# Patient Record
Sex: FEMALE | Race: OTHER | HISPANIC OR LATINO | URBAN - METROPOLITAN AREA
[De-identification: names, ages, dates, MRNs, and addresses within clinical notes are randomized per-mention and may not be internally consistent; named-entity substitution may affect disease eponyms.]

---

## 2019-08-12 ENCOUNTER — OUTPATIENT (OUTPATIENT)
Dept: OUTPATIENT SERVICES | Facility: HOSPITAL | Age: 19
LOS: 1 days | End: 2019-08-12
Payer: COMMERCIAL

## 2019-08-12 VITALS
TEMPERATURE: 98 F | DIASTOLIC BLOOD PRESSURE: 58 MMHG | RESPIRATION RATE: 16 BRPM | HEART RATE: 74 BPM | SYSTOLIC BLOOD PRESSURE: 113 MMHG

## 2019-08-12 VITALS — RESPIRATION RATE: 16 BRPM | TEMPERATURE: 98 F

## 2019-08-12 DIAGNOSIS — O47.02 FALSE LABOR BEFORE 37 COMPLETED WEEKS OF GESTATION, SECOND TRIMESTER: ICD-10-CM

## 2019-08-12 LAB
APPEARANCE UR: CLEAR — SIGNIFICANT CHANGE UP
BILIRUB UR-MCNC: NEGATIVE — SIGNIFICANT CHANGE UP
COLOR SPEC: YELLOW — SIGNIFICANT CHANGE UP
DIFF PNL FLD: NEGATIVE — SIGNIFICANT CHANGE UP
GLUCOSE UR QL: NEGATIVE MG/DL — SIGNIFICANT CHANGE UP
KETONES UR-MCNC: NEGATIVE — SIGNIFICANT CHANGE UP
LEUKOCYTE ESTERASE UR-ACNC: NEGATIVE — SIGNIFICANT CHANGE UP
NITRITE UR-MCNC: NEGATIVE — SIGNIFICANT CHANGE UP
PH UR: 8 — SIGNIFICANT CHANGE UP (ref 5–8)
PROT UR-MCNC: NEGATIVE MG/DL — SIGNIFICANT CHANGE UP
SP GR SPEC: 1.01 — SIGNIFICANT CHANGE UP (ref 1.01–1.02)
UROBILINOGEN FLD QL: NEGATIVE MG/DL — SIGNIFICANT CHANGE UP

## 2019-08-12 PROCEDURE — 59050 FETAL MONITOR W/REPORT: CPT

## 2019-08-12 PROCEDURE — G0463: CPT

## 2019-08-12 PROCEDURE — 81003 URINALYSIS AUTO W/O SCOPE: CPT

## 2019-08-12 PROCEDURE — 59025 FETAL NON-STRESS TEST: CPT

## 2019-08-12 NOTE — OB PROVIDER TRIAGE NOTE - HISTORY OF PRESENT ILLNESS
DONNELL NARANJO is a 19y  @ [id number] who presents to L&D due to suprapubic pain for 1 hour.    Patient states that one hour prior to admission, she began experiencing a sudden onset lower abdominal pain. States that the pain was sharp in nature, and radiated to her lower back. Abdominal pain was associated with one episode of vomiting, but otherwise denies any fevers, chills, dysuria, or vaginal bleeding.  Denies any leakage of fluid, or contractions, + Fetal movement.    Prenatal course otherwise uncomplicated      OBhx: Denies  PMH: Denies  PSH: Denies  Med: Denies  Allergies: Denies

## 2019-08-12 NOTE — OB PROVIDER TRIAGE NOTE - NSHPPHYSICALEXAM_GEN_ALL_CORE
Vitals:   Vital Signs Last 24 Hrs  T(C): 36.8 (12 Aug 2019 01:14), Max: 36.8 (12 Aug 2019 01:14)  T(F): 98.2 (12 Aug 2019 01:14), Max: 98.2 (12 Aug 2019 01:14)  HR: 74 (12 Aug 2019 01:18) (74 - 74)  BP: 113/58 (12 Aug 2019 01:18) (113/58 - 113/58)  RR: 16 (12 Aug 2019 01:14) (16 - 16)      General: AOx3, NAD  Heart: RRR  Lungs: CTAB  Abd: Soft, nontender, gravid  EXT: + CVA tenderness  SVE: Closed          FHT: 150 bpm, moderate variability, no decelerations present  [place]: No contractions present

## 2019-08-12 NOTE — OB PROVIDER TRIAGE NOTE - NSOBPROVIDERNOTE_OBGYN_ALL_OB_FT
DONNELL FERRARAJOHNJUAN is a 19y  @ [id number] who presents to L&D due to suprapubic pain for 1 hour.    - UA:  - Discharge home, follow up with Dr. Gonzales within the next week  - Counseled patient to return to L&D if presenting with decreased fetal movement, vaginal bleeding, leakage of fluid or contractions    D/w Dr. Gonzales

## 2019-08-12 NOTE — OB RN TRIAGE NOTE - NS_TRIAGEADDITIONAL COMMENTS_OBGYN_ALL_OB_FT
FHR strip reviewed and approved by MD Mercedes. As per MD Mercedes pt can be discharged at this time. Pt received  OB instruction, all questions answered and verbalized understanding. Pt was discharged in stable condition.

## 2019-11-11 ENCOUNTER — INPATIENT (INPATIENT)
Facility: HOSPITAL | Age: 19
LOS: 2 days | Discharge: HOME | End: 2019-11-14
Attending: SPECIALIST | Admitting: SPECIALIST
Payer: COMMERCIAL

## 2019-11-11 VITALS
RESPIRATION RATE: 13 BRPM | HEART RATE: 90 BPM | TEMPERATURE: 99 F | SYSTOLIC BLOOD PRESSURE: 116 MMHG | DIASTOLIC BLOOD PRESSURE: 63 MMHG

## 2019-11-11 DIAGNOSIS — Z3A.40 40 WEEKS GESTATION OF PREGNANCY: ICD-10-CM

## 2019-11-11 DIAGNOSIS — O47.03 FALSE LABOR BEFORE 37 COMPLETED WEEKS OF GESTATION, THIRD TRIMESTER: ICD-10-CM

## 2019-11-11 DIAGNOSIS — O26.893 OTHER SPECIFIED PREGNANCY RELATED CONDITIONS, THIRD TRIMESTER: ICD-10-CM

## 2019-11-11 LAB
ABO RH CONFIRMATION: SIGNIFICANT CHANGE UP
APPEARANCE UR: CLEAR — SIGNIFICANT CHANGE UP
BASOPHILS # BLD AUTO: 0.06 K/UL — SIGNIFICANT CHANGE UP (ref 0–0.2)
BASOPHILS NFR BLD AUTO: 0.5 % — SIGNIFICANT CHANGE UP (ref 0–2)
BILIRUB UR-MCNC: NEGATIVE — SIGNIFICANT CHANGE UP
BLD GP AB SCN SERPL QL: SIGNIFICANT CHANGE UP
COLOR SPEC: YELLOW — SIGNIFICANT CHANGE UP
DIFF PNL FLD: NEGATIVE — SIGNIFICANT CHANGE UP
EOSINOPHIL # BLD AUTO: 0.08 K/UL — SIGNIFICANT CHANGE UP (ref 0–0.5)
EOSINOPHIL NFR BLD AUTO: 0.6 % — SIGNIFICANT CHANGE UP (ref 0–6)
GLUCOSE UR QL: NEGATIVE MG/DL — SIGNIFICANT CHANGE UP
HCT VFR BLD CALC: 34.1 % — LOW (ref 34.5–45)
HGB BLD-MCNC: 11.3 G/DL — LOW (ref 11.5–15.5)
IMM GRANULOCYTES NFR BLD AUTO: 1.3 % — SIGNIFICANT CHANGE UP (ref 0–1.5)
KETONES UR-MCNC: NEGATIVE — SIGNIFICANT CHANGE UP
LEUKOCYTE ESTERASE UR-ACNC: NEGATIVE — SIGNIFICANT CHANGE UP
LYMPHOCYTES # BLD AUTO: 18.8 % — SIGNIFICANT CHANGE UP (ref 13–44)
LYMPHOCYTES # BLD AUTO: 2.41 K/UL — SIGNIFICANT CHANGE UP (ref 1–3.3)
MCHC RBC-ENTMCNC: 30.3 PG — SIGNIFICANT CHANGE UP (ref 27–34)
MCHC RBC-ENTMCNC: 33.1 GM/DL — SIGNIFICANT CHANGE UP (ref 32–36)
MCV RBC AUTO: 91.4 FL — SIGNIFICANT CHANGE UP (ref 80–100)
MONOCYTES # BLD AUTO: 0.83 K/UL — SIGNIFICANT CHANGE UP (ref 0–0.9)
MONOCYTES NFR BLD AUTO: 6.5 % — SIGNIFICANT CHANGE UP (ref 2–14)
NEUTROPHILS # BLD AUTO: 9.25 K/UL — HIGH (ref 1.8–7.4)
NEUTROPHILS NFR BLD AUTO: 72.3 % — SIGNIFICANT CHANGE UP (ref 43–77)
NITRITE UR-MCNC: NEGATIVE — SIGNIFICANT CHANGE UP
PH UR: 8 — SIGNIFICANT CHANGE UP (ref 5–8)
PLATELET # BLD AUTO: 268 K/UL — SIGNIFICANT CHANGE UP (ref 150–400)
PROT UR-MCNC: NEGATIVE MG/DL — SIGNIFICANT CHANGE UP
RBC # BLD: 3.73 M/UL — LOW (ref 3.8–5.2)
RBC # FLD: 13.4 % — SIGNIFICANT CHANGE UP (ref 10.3–14.5)
SP GR SPEC: 1.02 — SIGNIFICANT CHANGE UP (ref 1.01–1.02)
UROBILINOGEN FLD QL: NEGATIVE MG/DL — SIGNIFICANT CHANGE UP
WBC # BLD: 12.79 K/UL — HIGH (ref 3.8–10.5)
WBC # FLD AUTO: 12.79 K/UL — HIGH (ref 3.8–10.5)

## 2019-11-11 RX ORDER — SODIUM CHLORIDE 9 MG/ML
1000 INJECTION, SOLUTION INTRAVENOUS
Refills: 0 | Status: DISCONTINUED | OUTPATIENT
Start: 2019-11-11 | End: 2019-11-12

## 2019-11-11 RX ORDER — CITRIC ACID/SODIUM CITRATE 300-500 MG
30 SOLUTION, ORAL ORAL ONCE
Refills: 0 | Status: COMPLETED | OUTPATIENT
Start: 2019-11-11 | End: 2019-11-11

## 2019-11-11 RX ORDER — OXYTOCIN 10 UNIT/ML
333.33 VIAL (ML) INJECTION
Qty: 20 | Refills: 0 | Status: COMPLETED | OUTPATIENT
Start: 2019-11-11 | End: 2019-11-11

## 2019-11-11 RX ORDER — SODIUM CHLORIDE 9 MG/ML
1000 INJECTION, SOLUTION INTRAVENOUS ONCE
Refills: 0 | Status: COMPLETED | OUTPATIENT
Start: 2019-11-11 | End: 2019-11-11

## 2019-11-11 RX ORDER — FERROUS SULFATE 325(65) MG
1 TABLET ORAL
Qty: 0 | Refills: 0 | DISCHARGE

## 2019-11-11 RX ADMIN — SODIUM CHLORIDE 1000 MILLILITER(S): 9 INJECTION, SOLUTION INTRAVENOUS at 21:00

## 2019-11-11 RX ADMIN — Medication 30 MILLILITER(S): at 21:20

## 2019-11-11 RX ADMIN — SODIUM CHLORIDE 125 MILLILITER(S): 9 INJECTION, SOLUTION INTRAVENOUS at 20:45

## 2019-11-11 NOTE — OB PROVIDER H&P - HISTORY OF PRESENT ILLNESS
Patient is a 19  at 40 1/7 weeks gestation presenting to L&D for SROM at 1900 this evening.    She reports she has had contractions throughout the day and felt a gush of clear fluid around 7PM. She reports good fetal movement. She denies vaginal bleeding.    This pregnancy has been uncomplicated.    OBHx: denies  PMH: denies  PSH: denies  Meds: PNV  All: NKDA

## 2019-11-11 NOTE — CHART NOTE - NSCHARTNOTEFT_GEN_A_CORE
Patient reporting rectal pressure. She is requesting epidural for pain management.    Vital Signs Last 24 Hrs  T(C): 37 (11 Nov 2019 20:38), Max: 37.1 (11 Nov 2019 18:20)  T(F): 98.6 (11 Nov 2019 20:38), Max: 98.8 (11 Nov 2019 18:20)  HR: 82 (11 Nov 2019 20:38) (82 - 90)  BP: 127/73 (11 Nov 2019 20:38) (116/63 - 127/73)  RR: 16 (11 Nov 2019 20:38) (13 - 16)    SVE: 2/80/+1    FHT: baseline 120, moderate variability, + accels, - decels  [place]: ctx q2-3 min    -Dr. Llanos to get consent  -Will call CRNA for epidural placement  -Continue expectant management

## 2019-11-11 NOTE — OB PROVIDER H&P - ASSESSMENT
19  at 40 1/7 weeks gestation presenting to L&D in labor with SROM at 1900    -Admit to L&D  -Admission labs sent  -GBS neg, no need for abx  -Expectant mangement  -Pt requesting epidural for pain management  -Dr. Mercedes to consent patient    Discussed with Dr. Gonzales

## 2019-11-11 NOTE — OB PROVIDER H&P - NSHPPHYSICALEXAM_GEN_ALL_CORE
Vital Signs Last 24 Hrs  T(C): 37.1 (11 Nov 2019 18:20), Max: 37.1 (11 Nov 2019 18:20)  T(F): 98.8 (11 Nov 2019 18:20), Max: 98.8 (11 Nov 2019 18:20)  HR: 82 (11 Nov 2019 20:37) (82 - 90)  BP: 127/73 (11 Nov 2019 20:37) (116/63 - 127/73)  RR: 13 (11 Nov 2019 18:20) (13 - 13)    SVE: 2/50/-1  SSE: grossly ruptured, pooling clear fluid    [place]: cephalic    FHT: baseline 130, moderate variability, + accels, - decels  [place]: ctx 2-4min

## 2019-11-12 LAB — T PALLIDUM AB TITR SER: NEGATIVE — SIGNIFICANT CHANGE UP

## 2019-11-12 RX ORDER — OXYTOCIN 10 UNIT/ML
333.33 VIAL (ML) INJECTION
Qty: 20 | Refills: 0 | Status: DISCONTINUED | OUTPATIENT
Start: 2019-11-12 | End: 2019-11-14

## 2019-11-12 RX ORDER — MAGNESIUM HYDROXIDE 400 MG/1
30 TABLET, CHEWABLE ORAL
Refills: 0 | Status: DISCONTINUED | OUTPATIENT
Start: 2019-11-12 | End: 2019-11-14

## 2019-11-12 RX ORDER — AER TRAVELER 0.5 G/1
1 SOLUTION RECTAL; TOPICAL EVERY 4 HOURS
Refills: 0 | Status: DISCONTINUED | OUTPATIENT
Start: 2019-11-12 | End: 2019-11-14

## 2019-11-12 RX ORDER — ACETAMINOPHEN 500 MG
650 TABLET ORAL EVERY 4 HOURS
Refills: 0 | Status: DISCONTINUED | OUTPATIENT
Start: 2019-11-12 | End: 2019-11-14

## 2019-11-12 RX ORDER — KETOROLAC TROMETHAMINE 30 MG/ML
30 SYRINGE (ML) INJECTION ONCE
Refills: 0 | Status: DISCONTINUED | OUTPATIENT
Start: 2019-11-12 | End: 2019-11-12

## 2019-11-12 RX ORDER — GLYCERIN ADULT
1 SUPPOSITORY, RECTAL RECTAL AT BEDTIME
Refills: 0 | Status: DISCONTINUED | OUTPATIENT
Start: 2019-11-12 | End: 2019-11-14

## 2019-11-12 RX ORDER — IBUPROFEN 200 MG
600 TABLET ORAL EVERY 6 HOURS
Refills: 0 | Status: COMPLETED | OUTPATIENT
Start: 2019-11-12 | End: 2020-10-10

## 2019-11-12 RX ORDER — SODIUM CHLORIDE 9 MG/ML
3 INJECTION INTRAMUSCULAR; INTRAVENOUS; SUBCUTANEOUS EVERY 8 HOURS
Refills: 0 | Status: DISCONTINUED | OUTPATIENT
Start: 2019-11-12 | End: 2019-11-14

## 2019-11-12 RX ORDER — GENTAMICIN SULFATE 40 MG/ML
240 VIAL (ML) INJECTION EVERY 24 HOURS
Refills: 0 | Status: DISCONTINUED | OUTPATIENT
Start: 2019-11-12 | End: 2019-11-13

## 2019-11-12 RX ORDER — DIBUCAINE 1 %
1 OINTMENT (GRAM) RECTAL EVERY 6 HOURS
Refills: 0 | Status: DISCONTINUED | OUTPATIENT
Start: 2019-11-12 | End: 2019-11-14

## 2019-11-12 RX ORDER — AMPICILLIN TRIHYDRATE 250 MG
2 CAPSULE ORAL ONCE
Refills: 0 | Status: COMPLETED | OUTPATIENT
Start: 2019-11-12 | End: 2019-11-12

## 2019-11-12 RX ORDER — PRAMOXINE HYDROCHLORIDE 150 MG/15G
1 AEROSOL, FOAM RECTAL EVERY 4 HOURS
Refills: 0 | Status: DISCONTINUED | OUTPATIENT
Start: 2019-11-12 | End: 2019-11-14

## 2019-11-12 RX ORDER — AMPICILLIN TRIHYDRATE 250 MG
CAPSULE ORAL
Refills: 0 | Status: DISCONTINUED | OUTPATIENT
Start: 2019-11-12 | End: 2019-11-13

## 2019-11-12 RX ORDER — ACETAMINOPHEN 500 MG
975 TABLET ORAL
Refills: 0 | Status: DISCONTINUED | OUTPATIENT
Start: 2019-11-12 | End: 2019-11-14

## 2019-11-12 RX ORDER — BENZOCAINE 10 %
1 GEL (GRAM) MUCOUS MEMBRANE EVERY 6 HOURS
Refills: 0 | Status: DISCONTINUED | OUTPATIENT
Start: 2019-11-12 | End: 2019-11-14

## 2019-11-12 RX ORDER — OXYCODONE HYDROCHLORIDE 5 MG/1
5 TABLET ORAL
Refills: 0 | Status: DISCONTINUED | OUTPATIENT
Start: 2019-11-12 | End: 2019-11-14

## 2019-11-12 RX ORDER — LANOLIN
1 OINTMENT (GRAM) TOPICAL EVERY 6 HOURS
Refills: 0 | Status: DISCONTINUED | OUTPATIENT
Start: 2019-11-12 | End: 2019-11-14

## 2019-11-12 RX ORDER — AMPICILLIN TRIHYDRATE 250 MG
2 CAPSULE ORAL EVERY 6 HOURS
Refills: 0 | Status: DISCONTINUED | OUTPATIENT
Start: 2019-11-12 | End: 2019-11-13

## 2019-11-12 RX ORDER — TETANUS TOXOID, REDUCED DIPHTHERIA TOXOID AND ACELLULAR PERTUSSIS VACCINE, ADSORBED 5; 2.5; 8; 8; 2.5 [IU]/.5ML; [IU]/.5ML; UG/.5ML; UG/.5ML; UG/.5ML
0.5 SUSPENSION INTRAMUSCULAR ONCE
Refills: 0 | Status: DISCONTINUED | OUTPATIENT
Start: 2019-11-12 | End: 2019-11-14

## 2019-11-12 RX ORDER — HYDROCORTISONE 1 %
1 OINTMENT (GRAM) TOPICAL EVERY 6 HOURS
Refills: 0 | Status: DISCONTINUED | OUTPATIENT
Start: 2019-11-12 | End: 2019-11-14

## 2019-11-12 RX ORDER — SIMETHICONE 80 MG/1
80 TABLET, CHEWABLE ORAL EVERY 4 HOURS
Refills: 0 | Status: DISCONTINUED | OUTPATIENT
Start: 2019-11-12 | End: 2019-11-14

## 2019-11-12 RX ORDER — OXYCODONE HYDROCHLORIDE 5 MG/1
5 TABLET ORAL ONCE
Refills: 0 | Status: DISCONTINUED | OUTPATIENT
Start: 2019-11-12 | End: 2019-11-14

## 2019-11-12 RX ORDER — DIPHENHYDRAMINE HCL 50 MG
25 CAPSULE ORAL EVERY 6 HOURS
Refills: 0 | Status: DISCONTINUED | OUTPATIENT
Start: 2019-11-12 | End: 2019-11-14

## 2019-11-12 RX ORDER — IBUPROFEN 200 MG
600 TABLET ORAL EVERY 6 HOURS
Refills: 0 | Status: DISCONTINUED | OUTPATIENT
Start: 2019-11-12 | End: 2019-11-14

## 2019-11-12 RX ADMIN — Medication 200 MILLIGRAM(S): at 05:26

## 2019-11-12 RX ADMIN — SODIUM CHLORIDE 3 MILLILITER(S): 9 INJECTION INTRAMUSCULAR; INTRAVENOUS; SUBCUTANEOUS at 15:49

## 2019-11-12 RX ADMIN — Medication 600 MILLIGRAM(S): at 18:09

## 2019-11-12 RX ADMIN — Medication 216 GRAM(S): at 15:16

## 2019-11-12 RX ADMIN — Medication 1000 MILLIUNIT(S)/MIN: at 08:06

## 2019-11-12 RX ADMIN — Medication 30 MILLIGRAM(S): at 09:45

## 2019-11-12 RX ADMIN — Medication 650 MILLIGRAM(S): at 04:34

## 2019-11-12 RX ADMIN — Medication 216 GRAM(S): at 23:23

## 2019-11-12 RX ADMIN — SODIUM CHLORIDE 3 MILLILITER(S): 9 INJECTION INTRAMUSCULAR; INTRAVENOUS; SUBCUTANEOUS at 22:00

## 2019-11-12 RX ADMIN — Medication 600 MILLIGRAM(S): at 19:00

## 2019-11-12 RX ADMIN — Medication 30 MILLIGRAM(S): at 09:30

## 2019-11-12 RX ADMIN — Medication 975 MILLIGRAM(S): at 16:30

## 2019-11-12 RX ADMIN — Medication 975 MILLIGRAM(S): at 15:48

## 2019-11-12 RX ADMIN — Medication 600 MILLIGRAM(S): at 23:26

## 2019-11-12 RX ADMIN — Medication 216 GRAM(S): at 04:34

## 2019-11-12 RX ADMIN — SODIUM CHLORIDE 125 MILLILITER(S): 9 INJECTION, SOLUTION INTRAVENOUS at 04:36

## 2019-11-12 NOTE — DISCHARGE NOTE OB - HOSPITAL COURSE
Patient admitted in labor. Patient had a vaginal delivery. Hospital course was uncomplicated. Her pain was well controlled. She is tolerating a regular diet. She is ambulating independently. She was voiding without assistance. Patient with flatus. Labs and Vitals WNL at time of discharge.

## 2019-11-12 NOTE — OB RN DELIVERY SUMMARY - NS_SEPSISRSKCALC_OBGYN_ALL_OB_FT
EOS calculated successfully. EOS Risk Factor: 0.5/1000 live births ([facility] national incidence); GA=40w2d; Temp=102.02; ROM=12.533; GBS='Negative'; Antibiotics='Broad spectrum antibiotics 2-3.9 hrs prior to birth'

## 2019-11-12 NOTE — DISCHARGE NOTE OB - PATIENT PORTAL LINK FT
You can access the FollowMyHealth Patient Portal offered by [facility] by registering at the following website: [email or web address]. By joining [employer]’s FollowMyHealth portal, you will also be able to view your health information using other applications (apps) compatible with our system.

## 2019-11-12 NOTE — CHART NOTE - NSCHARTNOTEFT_GEN_A_CORE
Patient resting comfortably with epidural in place    Vital Signs Last 24 Hrs  T(C): 37 (11 Nov 2019 20:38), Max: 37.1 (11 Nov 2019 18:20)  T(F): 98.6 (11 Nov 2019 20:38), Max: 98.8 (11 Nov 2019 18:20)  HR: 67 (12 Nov 2019 00:12) (59 - 97)  BP: 100/59 (12 Nov 2019 00:11) (90/53 - 127/73)  RR: 16 (11 Nov 2019 20:38) (13 - 16)  SpO2: 98% (12 Nov 2019 00:12) (97% - 100%)    FHT: baseline 130, moderate variability, + accels, - decels  [place]: contractions q3-4min    -FHT cat 1  -Continue expectant management

## 2019-11-12 NOTE — DISCHARGE NOTE OB - CARE PROVIDER_API CALL
Kay Gonzales)  [facility] Obstetrics [facility] Gynecology  [facility] [place] [address]  Phone: [phone]  Fax: [phone]  Follow Up Time:

## 2019-11-12 NOTE — CHART NOTE - NSCHARTNOTEFT_GEN_A_CORE
Patient reports feeling rectal pressure all the time    Vital Signs Last 24 Hrs  T(C): 38.6 (12 Nov 2019 00:30), Max: 38.6 (12 Nov 2019 00:30)  T(F): 101.48 (12 Nov 2019 00:30), Max: 101.48 (12 Nov 2019 00:30)  HR: 86 (12 Nov 2019 01:37) (59 - 109)  BP: 125/64 (12 Nov 2019 01:26) (90/53 - 131/69)  RR: 16 (12 Nov 2019 00:30) (13 - 16)  SpO2: 99% (12 Nov 2019 01:37) (97% - 100%)    SEV: 3/90/0    FHT: baseline 140, moderate variability, + accels, - decels  [place]: ctx q2-3 min    -FHT cat 1  -Continue expectant management

## 2019-11-12 NOTE — DISCHARGE NOTE OB - MEDICATION SUMMARY - MEDICATIONS TO TAKE
I will START or STAY ON the medications listed below when I get home from the hospital:    ibuprofen 600 mg oral tablet  -- 1 tab(s) by mouth every 6 hours  -- Indication: For pain control    ferrous sulfate 325 mg (65 mg elemental iron) oral tablet  -- 1 tab(s) by mouth once a day  -- Indication: For maternal wellness    Prena1 oral capsule  -- 1 cap(s) by mouth once a day  -- Indication: For maternal wellness

## 2019-11-12 NOTE — CHART NOTE - NSCHARTNOTEFT_GEN_A_CORE
Labor Progress Note    S: Pt was complaining of rectal pressure.     O:  T(C): 38.3, Max: 38.9 (11-12-19 @ 01:30)  T(F): 100.94, Max: 102.02 (11-12-19 @ 01:30)  HR: 89 (59 - 109)  BP: 131/70 (90/53 - 131/70)  RR: 16 (13 - 16)  SpO2: 98% (96% - 100%)    FHT: 150 bpm, moderate, accelerations, no decels  [place]: q 4 mins  SVE: 5/90/2    A/P  Labor course complicated by intrapartum fever  Start on Amp/ Gent per Dr. Gonzales  Ordered Tylenol PO PRN for fever  In active labor without need of augmentation   Cat 1 tracing  GBS: neg      Ndiaye [id number]   Dr. Gonzales

## 2019-11-12 NOTE — OB PROVIDER DELIVERY SUMMARY - NSPROVIDERDELIVERYNOTE_OBGYN_ALL_OB_FT
Admitted with AROM and early labor. Patient hadepidural anesthesia in labor. Patient developed fever one time responded rylan antibiotic and tylenol. Labor progressed spontaneously and delivered  live baby girl at 8:02 AM Apgar 9/9/ Midline epistiotomy repaied in layers. Placenta spontaneous complete. at 8:06 AM. Blood loss 300 cc.

## 2019-11-13 LAB
HCT VFR BLD CALC: 26 % — LOW (ref 34.5–45)
HGB BLD-MCNC: 8.5 G/DL — LOW (ref 11.5–15.5)

## 2019-11-13 RX ORDER — OXYCODONE HYDROCHLORIDE 5 MG/1
5 TABLET ORAL ONCE
Refills: 0 | Status: DISCONTINUED | OUTPATIENT
Start: 2019-11-13 | End: 2019-11-14

## 2019-11-13 RX ORDER — IBUPROFEN 200 MG
1 TABLET ORAL
Qty: 28 | Refills: 0
Start: 2019-11-13 | End: 2019-11-19

## 2019-11-13 RX ORDER — FERROUS SULFATE 325(65) MG
325 TABLET ORAL
Refills: 0 | Status: DISCONTINUED | OUTPATIENT
Start: 2019-11-13 | End: 2019-11-14

## 2019-11-13 RX ORDER — INFLUENZA VIRUS VACCINE 15; 15; 15; 15 UG/.5ML; UG/.5ML; UG/.5ML; UG/.5ML
0.5 SUSPENSION INTRAMUSCULAR ONCE
Refills: 0 | Status: DISCONTINUED | OUTPATIENT
Start: 2019-11-13 | End: 2019-11-14

## 2019-11-13 RX ADMIN — Medication 216 GRAM(S): at 04:58

## 2019-11-13 RX ADMIN — Medication 600 MILLIGRAM(S): at 00:26

## 2019-11-13 RX ADMIN — Medication 975 MILLIGRAM(S): at 08:58

## 2019-11-13 RX ADMIN — Medication 600 MILLIGRAM(S): at 17:51

## 2019-11-13 RX ADMIN — SODIUM CHLORIDE 3 MILLILITER(S): 9 INJECTION INTRAMUSCULAR; INTRAVENOUS; SUBCUTANEOUS at 15:01

## 2019-11-13 RX ADMIN — Medication 600 MILLIGRAM(S): at 05:11

## 2019-11-13 RX ADMIN — Medication 975 MILLIGRAM(S): at 09:50

## 2019-11-13 RX ADMIN — Medication 975 MILLIGRAM(S): at 15:05

## 2019-11-13 RX ADMIN — Medication 216 GRAM(S): at 15:04

## 2019-11-13 RX ADMIN — Medication 600 MILLIGRAM(S): at 06:11

## 2019-11-13 RX ADMIN — Medication 975 MILLIGRAM(S): at 16:00

## 2019-11-13 RX ADMIN — Medication 1 TABLET(S): at 12:23

## 2019-11-13 RX ADMIN — Medication 200 MILLIGRAM(S): at 05:37

## 2019-11-13 RX ADMIN — Medication 600 MILLIGRAM(S): at 13:20

## 2019-11-13 RX ADMIN — Medication 216 GRAM(S): at 09:58

## 2019-11-13 RX ADMIN — Medication 600 MILLIGRAM(S): at 12:23

## 2019-11-13 RX ADMIN — SODIUM CHLORIDE 3 MILLILITER(S): 9 INJECTION INTRAMUSCULAR; INTRAVENOUS; SUBCUTANEOUS at 05:12

## 2019-11-13 NOTE — PROGRESS NOTE ADULT - SUBJECTIVE AND OBJECTIVE BOX
S: Patient doing well. Minimal lochia. No complaints.    O: Vital Signs Last 24 Hrs  T(C): 36.4 (13 Nov 2019 15:59), Max: 37 (13 Nov 2019 12:11)  T(F): 97.6 (13 Nov 2019 15:59), Max: 98.6 (13 Nov 2019 12:11)  HR: 79 (13 Nov 2019 15:59) (73 - 79)  BP: 110/67 (13 Nov 2019 15:59) (94/56 - 115/73)  BP(mean): --  RR: 18 (13 Nov 2019 15:59) (16 - 18)  SpO2: 98% (13 Nov 2019 15:59) (98% - 98%)    Gen: NAD  Abd: soft, NT, ND, fundus firm below umbilicus  Ext: no tenderness    Labs:                        8.5    x     )-----------( x        ( 13 Nov 2019 08:26 )             26.0          A/P PP # 1  Doing well.  Routine post partum care.  Discharge home in AM.

## 2019-11-13 NOTE — PROGRESS NOTE ADULT - ASSESSMENT
DONNELL MCALLISTERMARITA is a 19y  s/p vaginal delivery @ 00cwy7i. Labor course complicated by maternal fever. Patient received ampicillin and gentamicin during labor course. Antibiotics discontinued after delivery. PPD #1. No acute overnight events

## 2019-11-13 NOTE — PROGRESS NOTE ADULT - PROBLEM SELECTOR PLAN 1
She feels well  Follow up AM labs: Hgb 11.3 -->  Patient remained afebrile overnight.   Continue the current pain medication  Encourage  Ambulation  Encourage regular diet  DVT ppx: SCDs only when not ambulating

## 2019-11-13 NOTE — PROGRESS NOTE ADULT - SUBJECTIVE AND OBJECTIVE BOX
MRN: [id number]  Date Admitted: 19  Location: [facility] 2E 2006 ([facility] 2EST)  Attending: Kay Gonzales      Post Partum Progress Note: Vaginal delivery     DONNELL NARANJO is a 19y  s/p vaginal delivery @ 88nbv3y. Labor course complicated by maternal fever (Tmax 101.5 on 12 @ 04:30). Patient received ampicillin and gentamicin during labor course. Antibiotics discontinued after delivery. PPD #1 FEMALE INFANT,    SUBJECTIVE:  Patient was seen and examined at bedside.   No acute events overnight per nursing.   Reports feeling well this AM.   Pain is well controlled with PRN pain medication.   Tolerating a regular diet. Denies nausea/vomiting.   +Flatus/-BM  Voiding spontaneously. Ambulating without assistance.   Denies fevers, chills, SOB, CP, HA, vision changes or calf pain.      OBJECTIVE:  Physical exam:  General: AOx3, NAD.  Heart: RRR. S1S2.  Lungs: CTABL. Good airflow bilaterally.   Abdomen: +BS, Soft, appropriately tender, nondistended, no guarding or rebound tenderness, firm uterine fundus at umbilicus  Ext: No DVT signs, warm extremities.    Vital Signs Last 24 Hrs  T(C): 36.8 (2019 19:55), Max: 37.7 (2019 06:30)  T(F): 98.3 (2019 19:55), Max: 99.9 (2019 06:30)  HR: 74 (2019 19:55) (74 - 136)  BP: 115/73 (2019 19:55) (100/59 - 144/76)  RR: 18 (2019 19:55) (16 - 18)  SpO2: 98% (2019 19:55) (84% - 99%)    LABS:                        11.3   12.79 )-----------( 268      ( 2019 21:03 )             34.1     Urinalysis Basic - ( 2019 21:03 )    Color: Yellow / Appearance: Clear / S.020 / pH: x  Gluc: x / Ketone: Negative  / Bili: Negative / Urobili: Negative mg/dL   Blood: x / Protein: Negative mg/dL / Nitrite: Negative   Leuk Esterase: Negative / RBC: x / WBC x   Sq Epi: x / Non Sq Epi: x / Bacteria: x

## 2019-11-14 VITALS
DIASTOLIC BLOOD PRESSURE: 58 MMHG | HEART RATE: 77 BPM | SYSTOLIC BLOOD PRESSURE: 101 MMHG | RESPIRATION RATE: 18 BRPM | TEMPERATURE: 98 F

## 2019-11-14 PROCEDURE — 86850 RBC ANTIBODY SCREEN: CPT

## 2019-11-14 PROCEDURE — 90707 MMR VACCINE SC: CPT

## 2019-11-14 PROCEDURE — 59050 FETAL MONITOR W/REPORT: CPT

## 2019-11-14 PROCEDURE — 85018 HEMOGLOBIN: CPT

## 2019-11-14 PROCEDURE — 86780 TREPONEMA PALLIDUM: CPT

## 2019-11-14 PROCEDURE — 85014 HEMATOCRIT: CPT

## 2019-11-14 PROCEDURE — 85027 COMPLETE CBC AUTOMATED: CPT

## 2019-11-14 PROCEDURE — 36415 COLL VENOUS BLD VENIPUNCTURE: CPT

## 2019-11-14 PROCEDURE — 59025 FETAL NON-STRESS TEST: CPT

## 2019-11-14 PROCEDURE — G0463: CPT

## 2019-11-14 PROCEDURE — 86900 BLOOD TYPING SEROLOGIC ABO: CPT

## 2019-11-14 PROCEDURE — 86901 BLOOD TYPING SEROLOGIC RH(D): CPT

## 2019-11-14 PROCEDURE — 81003 URINALYSIS AUTO W/O SCOPE: CPT

## 2019-11-14 RX ADMIN — Medication 325 MILLIGRAM(S): at 09:05

## 2019-11-14 RX ADMIN — Medication 975 MILLIGRAM(S): at 10:00

## 2019-11-14 RX ADMIN — Medication 975 MILLIGRAM(S): at 09:05

## 2019-11-14 RX ADMIN — Medication 0.5 MILLILITER(S): at 11:35

## 2019-11-14 RX ADMIN — Medication 600 MILLIGRAM(S): at 05:52

## 2019-11-14 RX ADMIN — Medication 600 MILLIGRAM(S): at 06:34

## 2019-11-14 NOTE — PROGRESS NOTE ADULT - SUBJECTIVE AND OBJECTIVE BOX
S: Patient doing well. Minimal lochia. No complaints.     O: Vital Signs Last 24 Hrs  T(C): 36.9 (2019 20:10), Max: 37 (2019 12:11)  T(F): 98.4 (2019 20:10), Max: 98.6 (2019 12:11)  HR: 77 (2019 20:10) (73 - 79)  BP: 110/67 (2019 20:10) (94/56 - 110/67)  BP(mean): --  RR: 18 (2019 20:10) (16 - 18)  SpO2: 98% (2019 20:10) (98% - 98%)    Gen: NAD  Breast : breast feeding  Abd: soft, NT, ND, fundus firm below umbilicus  Lochia mild  Ext: no tenderness    Labs:                        8.5    x     )-----------( x        ( 2019 08:26 )             26.0            PP # 2 s/p     Doing well.  Discharge home.  Nothing in vagina and no heavy lifting for 6 weeks.   Follow up 6 weeks for post partum visit.  Call office for any fevers, chills, heavy vaginal bleeding, symptoms of depression, or any other concerning symptoms.  Continue motrin 600 mg every 6 hours.

## 2019-11-14 NOTE — PROGRESS NOTE ADULT - ASSESSMENT
DONNELL AVERYEDUARDOJUAN is a 19y  s/p vaginal delivery @ 42vtc0b. Labor course complicated by maternal fever (Tmax 101.5 on 12 @ 04:30). Patient received ampicillin and gentamicin during labor course. Antibiotics discontinued after delivery. PPD #2 FEMALE INFANT,

## 2019-11-14 NOTE — PROGRESS NOTE ADULT - SUBJECTIVE AND OBJECTIVE BOX
MRN: [id number]  Date Admitted: 19  Location: [facility] 2E 2006 ([facility] 2E)  Attending: Kay Gonzales      Post Partum Progress Note: Vaginal delivery     DONNELL NARANJO is a 19y  s/p vaginal delivery @ 95nml3i. Labor course complicated by maternal fever (Tmax 101.5 on 12 @ 04:30). Patient received ampicillin and gentamicin during labor course. Antibiotics discontinued after delivery. PPD #2 FEMALE INFANT,    SUBJECTIVE:  Patient was seen and examined at bedside.   No acute events overnight per nursing.   Reports feeling well this AM.   Pain is well controlled with PRN pain medication.   Tolerating a regular diet. Denies nausea/vomiting.   +Flatus/-BM  Voiding spontaneously. Ambulating without assistance.   Denies fevers, chills, SOB, CP, HA, vision changes or calf pain    OBJECTIVE:  Physical exam:  General: AOx3, NAD.  Heart: RRR. S1S2.  Lungs: CTABL. Good airflow bilaterally.   Abdomen: +BS, Soft, appropriately tender, nondistended, no guarding or rebound tenderness, firm uterine fundus at umbilicus  Ext: No DVT signs, warm extremities.    Vital Signs Last 24 Hrs  T(C): 36.9 (2019 20:10), Max: 37 (2019 12:11)  T(F): 98.4 (2019 20:10), Max: 98.6 (2019 12:11)  HR: 77 (2019 20:10) (73 - 79)  BP: 110/67 (2019 20:10) (94/56 - 110/67)  RR: 18 (2019 20:10) (16 - 18)  SpO2: 98% (2019 20:10) (98% - 98%)    LABS:                        8.5    x     )-----------( x        ( 2019 08:26 )             26.0

## 2019-11-14 NOTE — PROGRESS NOTE ADULT - PROBLEM SELECTOR PLAN 1
She feels well  Follow up AM labs: Hgb 11.3 -->8.5  Patient remained afebrile overnight.   Continue the current pain medication  Encourage  Ambulation  Encourage regular diet  DVT ppx: SCDs only when not ambulating  Plan for discharge today pending attending assessment

## 2020-01-09 NOTE — OB RN PATIENT PROFILE - NS_CONTACTNUMBEROFSUPPORTPERSON_OBGYN_ALL_OB_FT
[phone] Airway patent, Nasal mucosa clear. Mouth with normal mucosa. Throat has no vesicles, no oropharyngeal exudates and uvula is midline.

## 2021-01-28 NOTE — OB RN PATIENT PROFILE - 'COMMUNITY AGENCIES/SUPPORT GROUPS, OB PROFILE
Problem: NORMAL   Goal: Experiences normal transition  Description: INTERVENTIONS:  - Assess and monitor vital signs and lab values.   - Encourage skin-to-skin with caregiver for thermoregulation  - Assess signs, symptoms and risk factors for hypog [facility] (W[facility])

## 2021-12-23 ENCOUNTER — EMERGENCY (EMERGENCY)
Facility: HOSPITAL | Age: 21
LOS: 1 days | Discharge: HOME | End: 2021-12-23
Attending: EMERGENCY MEDICINE
Payer: COMMERCIAL

## 2021-12-23 VITALS
DIASTOLIC BLOOD PRESSURE: 78 MMHG | OXYGEN SATURATION: 100 % | HEART RATE: 102 BPM | RESPIRATION RATE: 20 BRPM | HEIGHT: 60 IN | TEMPERATURE: 99 F | SYSTOLIC BLOOD PRESSURE: 120 MMHG

## 2021-12-23 LAB
ALBUMIN SERPL ELPH-MCNC: 4.7 G/DL — SIGNIFICANT CHANGE UP (ref 3.3–5.2)
ALP SERPL-CCNC: 66 U/L — SIGNIFICANT CHANGE UP (ref 40–120)
ALT FLD-CCNC: 10 U/L — SIGNIFICANT CHANGE UP
ANION GAP SERPL CALC-SCNC: 15 MMOL/L — SIGNIFICANT CHANGE UP (ref 5–17)
APPEARANCE UR: ABNORMAL
AST SERPL-CCNC: 12 U/L — SIGNIFICANT CHANGE UP
BACTERIA # UR AUTO: ABNORMAL
BASOPHILS # BLD AUTO: 0.03 K/UL — SIGNIFICANT CHANGE UP (ref 0–0.2)
BASOPHILS NFR BLD AUTO: 0.6 % — SIGNIFICANT CHANGE UP (ref 0–2)
BILIRUB SERPL-MCNC: 0.4 MG/DL — SIGNIFICANT CHANGE UP (ref 0.4–2)
BILIRUB UR-MCNC: NEGATIVE — SIGNIFICANT CHANGE UP
BUN SERPL-MCNC: 6.2 MG/DL — LOW (ref 8–20)
CALCIUM SERPL-MCNC: 9.3 MG/DL — SIGNIFICANT CHANGE UP (ref 8.6–10.2)
CHLORIDE SERPL-SCNC: 101 MMOL/L — SIGNIFICANT CHANGE UP (ref 98–107)
CO2 SERPL-SCNC: 18 MMOL/L — LOW (ref 22–29)
COLOR SPEC: YELLOW — SIGNIFICANT CHANGE UP
CREAT SERPL-MCNC: 0.44 MG/DL — LOW (ref 0.5–1.3)
DIFF PNL FLD: ABNORMAL
EOSINOPHIL # BLD AUTO: 0.01 K/UL — SIGNIFICANT CHANGE UP (ref 0–0.5)
EOSINOPHIL NFR BLD AUTO: 0.2 % — SIGNIFICANT CHANGE UP (ref 0–6)
EPI CELLS # UR: ABNORMAL
FLUAV AG NPH QL: SIGNIFICANT CHANGE UP
FLUBV AG NPH QL: SIGNIFICANT CHANGE UP
GLUCOSE SERPL-MCNC: 85 MG/DL — SIGNIFICANT CHANGE UP (ref 70–99)
GLUCOSE UR QL: NEGATIVE MG/DL — SIGNIFICANT CHANGE UP
HCG SERPL-ACNC: HIGH MIU/ML
HCT VFR BLD CALC: 36.6 % — SIGNIFICANT CHANGE UP (ref 34.5–45)
HGB BLD-MCNC: 12.3 G/DL — SIGNIFICANT CHANGE UP (ref 11.5–15.5)
IMM GRANULOCYTES NFR BLD AUTO: 0 % — SIGNIFICANT CHANGE UP (ref 0–1.5)
KETONES UR-MCNC: ABNORMAL
LEUKOCYTE ESTERASE UR-ACNC: ABNORMAL
LYMPHOCYTES # BLD AUTO: 0.51 K/UL — LOW (ref 1–3.3)
LYMPHOCYTES # BLD AUTO: 9.9 % — LOW (ref 13–44)
MCHC RBC-ENTMCNC: 31.1 PG — SIGNIFICANT CHANGE UP (ref 27–34)
MCHC RBC-ENTMCNC: 33.6 GM/DL — SIGNIFICANT CHANGE UP (ref 32–36)
MCV RBC AUTO: 92.4 FL — SIGNIFICANT CHANGE UP (ref 80–100)
MONOCYTES # BLD AUTO: 0.43 K/UL — SIGNIFICANT CHANGE UP (ref 0–0.9)
MONOCYTES NFR BLD AUTO: 8.4 % — SIGNIFICANT CHANGE UP (ref 2–14)
NEUTROPHILS # BLD AUTO: 4.16 K/UL — SIGNIFICANT CHANGE UP (ref 1.8–7.4)
NEUTROPHILS NFR BLD AUTO: 80.9 % — HIGH (ref 43–77)
NITRITE UR-MCNC: NEGATIVE — SIGNIFICANT CHANGE UP
PH UR: 6 — SIGNIFICANT CHANGE UP (ref 5–8)
PLATELET # BLD AUTO: 193 K/UL — SIGNIFICANT CHANGE UP (ref 150–400)
POTASSIUM SERPL-MCNC: 3.5 MMOL/L — SIGNIFICANT CHANGE UP (ref 3.5–5.3)
POTASSIUM SERPL-SCNC: 3.5 MMOL/L — SIGNIFICANT CHANGE UP (ref 3.5–5.3)
PROT SERPL-MCNC: 8.4 G/DL — SIGNIFICANT CHANGE UP (ref 6.6–8.7)
PROT UR-MCNC: 30 MG/DL
RBC # BLD: 3.96 M/UL — SIGNIFICANT CHANGE UP (ref 3.8–5.2)
RBC # FLD: 13.5 % — SIGNIFICANT CHANGE UP (ref 10.3–14.5)
RBC CASTS # UR COMP ASSIST: ABNORMAL /HPF (ref 0–4)
RSV RNA NPH QL NAA+NON-PROBE: SIGNIFICANT CHANGE UP
SARS-COV-2 RNA SPEC QL NAA+PROBE: SIGNIFICANT CHANGE UP
SODIUM SERPL-SCNC: 134 MMOL/L — LOW (ref 135–145)
SP GR SPEC: 1.02 — SIGNIFICANT CHANGE UP (ref 1.01–1.02)
UROBILINOGEN FLD QL: NEGATIVE MG/DL — SIGNIFICANT CHANGE UP
WBC # BLD: 5.14 K/UL — SIGNIFICANT CHANGE UP (ref 3.8–10.5)
WBC # FLD AUTO: 5.14 K/UL — SIGNIFICANT CHANGE UP (ref 3.8–10.5)
WBC UR QL: SIGNIFICANT CHANGE UP

## 2021-12-23 PROCEDURE — 76801 OB US < 14 WKS SINGLE FETUS: CPT | Mod: 26

## 2021-12-23 PROCEDURE — 81001 URINALYSIS AUTO W/SCOPE: CPT

## 2021-12-23 PROCEDURE — 99284 EMERGENCY DEPT VISIT MOD MDM: CPT | Mod: 25

## 2021-12-23 PROCEDURE — 99285 EMERGENCY DEPT VISIT HI MDM: CPT

## 2021-12-23 PROCEDURE — 87637 SARSCOV2&INF A&B&RSV AMP PRB: CPT

## 2021-12-23 PROCEDURE — 76801 OB US < 14 WKS SINGLE FETUS: CPT

## 2021-12-23 PROCEDURE — 87086 URINE CULTURE/COLONY COUNT: CPT

## 2021-12-23 PROCEDURE — 36415 COLL VENOUS BLD VENIPUNCTURE: CPT

## 2021-12-23 PROCEDURE — 84702 CHORIONIC GONADOTROPIN TEST: CPT

## 2021-12-23 PROCEDURE — 80053 COMPREHEN METABOLIC PANEL: CPT

## 2021-12-23 PROCEDURE — 85025 COMPLETE CBC W/AUTO DIFF WBC: CPT

## 2021-12-23 RX ORDER — CEPHALEXIN 500 MG
500 CAPSULE ORAL ONCE
Refills: 0 | Status: COMPLETED | OUTPATIENT
Start: 2021-12-23 | End: 2021-12-23

## 2021-12-23 RX ORDER — CEPHALEXIN 500 MG
1 CAPSULE ORAL
Qty: 14 | Refills: 0
Start: 2021-12-23 | End: 2021-12-29

## 2021-12-23 RX ORDER — ACETAMINOPHEN 500 MG
650 TABLET ORAL ONCE
Refills: 0 | Status: COMPLETED | OUTPATIENT
Start: 2021-12-23 | End: 2021-12-23

## 2021-12-23 RX ADMIN — Medication 500 MILLIGRAM(S): at 16:42

## 2021-12-23 RX ADMIN — Medication 650 MILLIGRAM(S): at 12:37

## 2021-12-23 NOTE — ED ADULT NURSE NOTE - CHIEF COMPLAINT QUOTE
fevers, cough for 3 days, pt also co lower back pain and pain in her uterus pt about 11 weeks pregnant

## 2021-12-23 NOTE — ED ADULT TRIAGE NOTE - CHIEF COMPLAINT QUOTE
fevers, cough for 3 days fevers, cough for 3 days, pt also co lower back pain and pain in her uterus pt about 11 weeks pregnant

## 2021-12-23 NOTE — ED STATDOCS - PHYSICAL EXAMINATION
Gen: No acute distress, non toxic  HEENT: Mucous membranes moist, pink conjunctivae, EOMI. (+)Nasal congestion  CV: RRR, nl s1/s2.  Resp: CTAB, normal rate and effort  GI: (+)Minimal suprapubic tenderness. No rebound or guarding   : No CVAT  Neuro: A&O x 3, moving all 4 extremities  MSK: No spine or joint tenderness to palpation  Skin: No rashes. intact and perfused.

## 2021-12-23 NOTE — ED STATDOCS - CLINICAL SUMMARY MEDICAL DECISION MAKING FREE TEXT BOX
Pt 11 weeks pregnant with suprapubic pain, viral-like symptoms and fever. Will swab for flu and covid. Check UA and US, reassess.

## 2021-12-23 NOTE — ED STATDOCS - ATTENDING CONTRIBUTION TO CARE
Cristhian: I performed a face to face bedside interview with patient regarding history of present illness, review of symptoms and past medical history. I completed an independent physical exam and ordered tests/medications as needed.  I have discussed patient's plan of care with advanced care provider. The advanced care provider assisted in  executing the discussed plan. I was available for any questions or issues that may have arose during the execution of the plan of care.

## 2021-12-23 NOTE — ED STATDOCS - OBJECTIVE STATEMENT
20 y/o female  at 11 weeks pregnant, presents to the ED c/o 1 day of suprapubic pain as well as a fever of 102, cough, and congestion. Denies vaginal bleeding or contraction-like pain. Denies urinary symptoms, chest pain, or difficulty breathing.

## 2021-12-23 NOTE — ED STATDOCS - NSFOLLOWUPINSTRUCTIONS_ED_ALL_ED_FT
- Rachel un seguimiento con paredes médico de atención primaria en 1 o 2 días. Si no puede hacer un seguimiento con paredes médico de atención primaria, regrese al servicio de urgencias por cualquier problema urgente.  - Busque atención médica inmediata ante cualquier signo o síntoma nuevo, que empeore o que le preocupe.  - [place] los medicamentos según las indicaciones, asegúrese de leer todas las instrucciones en el empaque  - Se le entregaron copias de todos los resultados de jyotsna pruebas, llévelas a paredes médico de atención primaria para que revise los resultados anormales  - Seguimiento con ginecólogo en hebert semana     - Si tiene dificultades para realizar un seguimiento, llame al: [phone] ([id number]) o visite [email or web address] para obtener un médico o especialista de [facility] que acepte paredes seguro en paredes área.    ¡Sentirse mejor!     Hemorragia subcoriónica    LO QUE NECESITA SABER:    Hebert hemorragia subcoriónica, o hematoma, es hebert acumulación de kathleen entre la placenta y el útero. Esta hemorragia generalmente se desarrolla a finales del primer trimestre. El sangrado bart siempre es absorbido por paredes propio cuerpo usualmente a las 20 semanas de embarazo. La mayoría de los embarazos progresan sin problemas. Es probable que presente manchado ocasional o sangrado ligero a lo theresa de paredes embarazo.    INSTRUCCIONES SOBRE EL LISBET HOSPITALARIA:    Regrese a la primo de emergencias si:  •Tiene fiebre.      •Usted tiene dolor abdominal.      •Usted tiene un aumento repentino en el sangrado.      Comuníquese con paredes médico si:  •Paredes sangrado ha aumentado.      •Usted tiene preguntas o inquietudes acerca de paredes condición o cuidado.      [place] pélvico:No tenga relaciones sexuales, no tome duchas vaginales ni use tampones. No rachel esfuerzo ni alce objetos pesados. Estas actividades pueden ocasionar contracciones o infecciones y hasta pueden poner en riesgo paredes aida y la de paredes bebé. Es probable que usted necesite descansar más de lo normal. Rachel jyotsna actividades diarias según indicaciones dadas.    Acuda a jyotsna consultas de control con paredes médico según le indicaron.Es posible que necesite regresar con frecuencia para que le realicen ultrasonidos. Anote jyotsna preguntas para que se acuerde de hacerlas yaya jyotsna visitas.

## 2021-12-23 NOTE — ED STATDOCS - PROGRESS NOTE DETAILS
MAX Barcenas NOTE: Pt evaluated at bedside. Pt 11 weeks pregnant, follows with her GYN, c/o intermittent dysuria and lower abd discomfort. Pt evaluated prior by intake physician. Otherwise HPI/PE/ROS as noted above. Will follow up plan per intake physician.  Reviewed all results and plan; will tx with keflex, advised on Pelvic rest. Pt stable for d/c, reports improvement, VSS, tolerating PO, ambulatory.  Discussion includes results, plan, proper medication use/side effects, and return precautions. Pt advised to f/u with PMD 1-2 days and specialists discussed.  Printed copies of available lab/radiology results contained within discharge packet. Pt verbalized understanding/agreement of plan. ED  Radha Mclain

## 2021-12-23 NOTE — ED STATDOCS - PATIENT PORTAL LINK FT
You can access the FollowMyHealth Patient Portal offered by [facility] by registering at the following website: [email or web address]. By joining [employer]’s FollowMyHealth portal, you will also be able to view your health information using other applications (apps) compatible with our system.

## 2021-12-23 NOTE — ED STATDOCS - NS ED ROS FT
Const: (+)fever/chills.    CV: No chest pain  Resp: No SOB. (+)cough, (+)nasal congestion  GI: (+)abdominal pain, No N/V/D  : No dysuria/frequency.    Neuro: No headache. No Dizziness. No numbness/weakness  Skin: No rashes

## 2021-12-24 LAB
CULTURE RESULTS: SIGNIFICANT CHANGE UP
SPECIMEN SOURCE: SIGNIFICANT CHANGE UP

## 2022-11-29 ENCOUNTER — EMERGENCY (EMERGENCY)
Facility: HOSPITAL | Age: 22
LOS: 1 days | Discharge: LEFT BEFORE DISCHARGE | End: 2022-11-29

## 2022-11-29 VITALS
OXYGEN SATURATION: 96 % | WEIGHT: 126.1 LBS | RESPIRATION RATE: 20 BRPM | DIASTOLIC BLOOD PRESSURE: 65 MMHG | HEART RATE: 112 BPM | SYSTOLIC BLOOD PRESSURE: 105 MMHG | TEMPERATURE: 99 F

## 2022-11-29 PROCEDURE — L9991: CPT

## 2022-11-29 NOTE — ED ADULT TRIAGE NOTE - CHIEF COMPLAINT QUOTE
C/O nausea, vomiting and fever for the past day. fever as high as 101.2 at home. Last took Tylenol at 9pm tonight. Denies pain or diarrhea.

## 2023-02-14 NOTE — OB PROVIDER H&P - NSINFECTIONS_OBGYN_ALL_OB
Concern reviewed with Dr. Omalley, orders received for Gabapentin mir. Patient should take 1 tablet every other day for 7 days and then she will be finished with the Gabapentin. Patient made aware and agrees with plan.  
Patient called stating that she thinks she needs to be weaned off of her gabapentin. She was ordered to take 1 tablet at night & to increase to 3 times daily when tolerated. She states that she can't take more than 1 pill due to left & right leg pain along with left ankle pain. Patient states she is only taking 1 tablet every morning. Patient states she needs to be weaned off because it is affecting her creatinine. It was at 63 and is now at 50, which her urologist is keeping track of. Patient is wondering if she can slowly be taken off of this medication.  
None